# Patient Record
Sex: MALE | Race: WHITE
[De-identification: names, ages, dates, MRNs, and addresses within clinical notes are randomized per-mention and may not be internally consistent; named-entity substitution may affect disease eponyms.]

---

## 2021-04-08 DIAGNOSIS — Z23 ENCOUNTER FOR IMMUNIZATION: ICD-10-CM

## 2024-02-16 ENCOUNTER — HOSPITAL ENCOUNTER (OUTPATIENT)
Dept: HOSPITAL 99 - GI | Age: 69
End: 2024-02-16
Payer: COMMERCIAL

## 2024-02-16 DIAGNOSIS — K63.89: ICD-10-CM

## 2024-02-16 DIAGNOSIS — Z12.11: Primary | ICD-10-CM

## 2024-02-16 DIAGNOSIS — K57.30: ICD-10-CM

## 2024-02-16 DIAGNOSIS — Z86.010: ICD-10-CM

## 2024-02-16 DIAGNOSIS — K64.4: ICD-10-CM

## 2024-02-16 DIAGNOSIS — K64.8: ICD-10-CM

## 2024-02-16 DIAGNOSIS — K59.00: ICD-10-CM

## 2024-02-16 DIAGNOSIS — Z98.0: ICD-10-CM

## 2024-02-16 PROCEDURE — 88305 TISSUE EXAM BY PATHOLOGIST: CPT

## 2024-02-16 PROCEDURE — 45380 COLONOSCOPY AND BIOPSY: CPT

## 2024-02-20 ENCOUNTER — HOSPITAL ENCOUNTER (OUTPATIENT)
Dept: HOSPITAL 99 - RAD | Age: 69
End: 2024-02-20
Payer: COMMERCIAL

## 2024-02-20 DIAGNOSIS — Z96.653: Primary | ICD-10-CM

## 2024-02-20 PROCEDURE — A9503 TC99M MEDRONATE: HCPCS

## 2024-02-21 ENCOUNTER — HOSPITAL ENCOUNTER (OUTPATIENT)
Dept: HOSPITAL 99 - REG | Age: 69
End: 2024-02-21
Payer: COMMERCIAL

## 2024-02-21 DIAGNOSIS — Z96.651: Primary | ICD-10-CM

## 2024-02-21 LAB
CRP SERPL-MCNC: 9.4 MG/L (ref 0–10)
ERYTHROCYTE [DISTWIDTH] IN BLOOD BY AUTOMATED COUNT: 14.4 % (ref 11.5–14.5)
HCT VFR BLD AUTO: 40.9 % (ref 39–52)
HGB BLD-MCNC: 13.6 G/DL (ref 13–18)
MCHC RBC AUTO-ENTMCNC: 33.3 G/DL (ref 33–37)
MCV RBC AUTO: 87.2 FL (ref 80–94)
NRBC BLD AUTO-RTO: 0 %
PLATELET # BLD AUTO: 381 10^3/UL (ref 130–400)

## 2024-11-29 ENCOUNTER — OFFICE VISIT (OUTPATIENT)
Dept: URGENT CARE | Facility: CLINIC | Age: 69
End: 2024-11-29
Payer: MEDICARE

## 2024-11-29 VITALS — TEMPERATURE: 97.5 F

## 2024-11-29 DIAGNOSIS — S61.209A AVULSION OF FINGER TIP, INITIAL ENCOUNTER: Primary | ICD-10-CM

## 2024-11-29 PROCEDURE — G0463 HOSPITAL OUTPT CLINIC VISIT: HCPCS

## 2024-11-29 PROCEDURE — 99203 OFFICE O/P NEW LOW 30 MIN: CPT

## 2024-11-29 PROCEDURE — 12001 RPR S/N/AX/GEN/TRNK 2.5CM/<: CPT

## 2024-11-29 RX ORDER — FAMOTIDINE 20 MG/1
20 TABLET, FILM COATED ORAL
COMMUNITY

## 2024-11-29 RX ORDER — LISINOPRIL 10 MG/1
TABLET ORAL
COMMUNITY

## 2024-11-29 RX ORDER — AMOXICILLIN 500 MG/1
500 CAPSULE ORAL EVERY 12 HOURS
COMMUNITY

## 2024-11-29 RX ORDER — METOPROLOL SUCCINATE 200 MG/1
200 TABLET, EXTENDED RELEASE ORAL EVERY EVENING
COMMUNITY

## 2024-11-29 RX ORDER — BUPROPION HYDROCHLORIDE 300 MG/1
300 TABLET ORAL EVERY MORNING
COMMUNITY
Start: 2024-09-09

## 2024-11-29 RX ORDER — BUPROPION HYDROCHLORIDE 150 MG/1
TABLET ORAL
COMMUNITY
Start: 2024-11-06

## 2024-11-29 RX ORDER — TRAMADOL HYDROCHLORIDE 50 MG/1
50 TABLET ORAL EVERY 4 HOURS PRN
COMMUNITY

## 2024-11-29 NOTE — PATIENT INSTRUCTIONS
Thank you for trusting your health with [facility].    Please review the following instructions and return to our clinic or consider an Emergency Department visit for worsening or severe symptoms.      Instructions:   Please keep fingertip wrapped when doing activities.  Do NOT peel glue from fingertip.  If you have concerns about redness, pain, swelling, or signs of infection- please come back.

## 2024-11-29 NOTE — PROGRESS NOTES
Name: Mumtaz Calvo      : 1955      MRN: [id number]  Encounter Provider: Lynette Harley PA-C  Encounter Date: 2024   Encounter department: [facility] [place]  :  Assessment & Plan  Avulsion of finger tip, initial encounter       Procedures  Avulsion of left fingertip was cleaned with saline and wound cleanser for 1 minute.  Hemostasis was confirmed and wound was dried.  Applied 3 coats of dermabond skin glue to small avulsion to ensure hemostasis given pt is on Eliquis.    No abx needed given pt takes amoxicillin daily.  Has up to date tetanus.    Recommended the following to pt.  Instructions:   Please keep fingertip wrapped when doing activities.  Do NOT peel glue from fingertip.  If you have concerns about redness, pain, swelling, or signs of infection- please come back.        History of Present Illness     Laceration       Mumtaz Calvo is a 68 y.o. male who presents with an avulsion of the lateral tip of his left index finger after cutting it on a table saw this afternoon. Pt has had an updated tetanus booster in the last 5 years. Pt is on amoxicillin BID for a hx of an infected joint replacement already. Pt is on eliquis but bleeding of injury is stable at time of visit.    Avulsion is relatively clean and there is no flap to suture.    History obtained from: patient    Review of Systems   Skin:  Positive for wound.          Objective   Temp 97.5 °F (36.4 °C)      Physical Exam  Constitutional:       Appearance: Normal appearance.   HENT:      Head: Normocephalic and atraumatic.   Skin:     General: Skin is warm and dry.      Comments: There is a 1x1 cm avulsion of the lateral portion of the left index finger including partial nail and nail bed. There is no portion adequate to suture. No sign of infection or retained body or exposed bone.   Neurological:      Mental Status: He is alert.